# Patient Record
Sex: MALE | Race: WHITE | NOT HISPANIC OR LATINO | Employment: OTHER | URBAN - METROPOLITAN AREA
[De-identification: names, ages, dates, MRNs, and addresses within clinical notes are randomized per-mention and may not be internally consistent; named-entity substitution may affect disease eponyms.]

---

## 2017-05-17 PROBLEM — R06.83 SNORING: Status: ACTIVE | Noted: 2017-05-17

## 2017-05-17 PROBLEM — R41.9 COGNITIVE COMPLAINTS: Status: ACTIVE | Noted: 2017-05-17

## 2018-08-17 PROBLEM — M17.12 PRIMARY OSTEOARTHRITIS OF LEFT KNEE: Status: ACTIVE | Noted: 2018-08-17

## 2018-08-17 PROBLEM — M25.462 EFFUSION OF LEFT KNEE: Status: ACTIVE | Noted: 2018-08-17

## 2020-03-04 ENCOUNTER — HOSPITAL ENCOUNTER (OUTPATIENT)
Dept: BEHAVIORAL HEALTH | Age: 75
Discharge: STILL ADMITTED OR ADMINISTRATIVE | End: 2020-03-04
Attending: PSYCHOLOGIST

## 2020-03-04 PROCEDURE — 96116 NUBHVL XM PHYS/QHP 1ST HR: CPT | Performed by: PSYCHOLOGIST

## 2020-03-04 PROCEDURE — 96136 PSYCL/NRPSYC TST PHY/QHP 1ST: CPT | Performed by: PSYCHOLOGIST

## 2020-03-04 PROCEDURE — 96121 NUBHVL XM PHY/QHP EA ADDL HR: CPT | Performed by: PSYCHOLOGIST

## 2020-03-25 ENCOUNTER — HOSPITAL ENCOUNTER (OUTPATIENT)
Dept: BEHAVIORAL HEALTH | Age: 75
Discharge: STILL ADMITTED OR ADMINISTRATIVE | End: 2020-03-25
Attending: PSYCHOLOGIST

## 2020-03-25 PROCEDURE — 90791 PSYCH DIAGNOSTIC EVALUATION: CPT | Performed by: PSYCHOLOGIST

## 2020-06-19 PROBLEM — G31.9 CEREBRAL ATROPHY, MILD (HCC): Status: ACTIVE | Noted: 2020-06-19

## 2021-01-08 PROBLEM — G30.1 LATE ONSET ALZHEIMER'S DISEASE WITHOUT BEHAVIORAL DISTURBANCE (HCC): Status: ACTIVE | Noted: 2017-05-17

## 2021-01-08 PROBLEM — F02.80 LATE ONSET ALZHEIMER'S DISEASE WITHOUT BEHAVIORAL DISTURBANCE (HCC): Status: ACTIVE | Noted: 2017-05-17

## 2021-01-26 PROBLEM — I77.1 TORTUOUS AORTA (HCC): Status: ACTIVE | Noted: 2021-01-26

## 2021-06-16 ENCOUNTER — APPOINTMENT (OUTPATIENT)
Dept: GENERAL RADIOLOGY | Facility: HOSPITAL | Age: 76
End: 2021-06-16
Attending: EMERGENCY MEDICINE
Payer: MEDICARE

## 2021-06-16 ENCOUNTER — HOSPITAL ENCOUNTER (EMERGENCY)
Facility: HOSPITAL | Age: 76
Discharge: HOME | End: 2021-06-16
Attending: EMERGENCY MEDICINE
Payer: MEDICARE

## 2021-06-16 VITALS
HEART RATE: 45 BPM | DIASTOLIC BLOOD PRESSURE: 74 MMHG | BODY MASS INDEX: 31 KG/M2 | WEIGHT: 229.94 LBS | TEMPERATURE: 98 F | OXYGEN SATURATION: 100 % | SYSTOLIC BLOOD PRESSURE: 129 MMHG | RESPIRATION RATE: 15 BRPM

## 2021-06-16 DIAGNOSIS — E16.2 HYPOGLYCEMIA: ICD-10-CM

## 2021-06-16 DIAGNOSIS — R00.1 BRADYCARDIA: Primary | ICD-10-CM

## 2021-06-16 PROCEDURE — 93005 ELECTROCARDIOGRAM TRACING: CPT

## 2021-06-16 PROCEDURE — 96360 HYDRATION IV INFUSION INIT: CPT

## 2021-06-16 PROCEDURE — 85025 COMPLETE CBC W/AUTO DIFF WBC: CPT | Performed by: EMERGENCY MEDICINE

## 2021-06-16 PROCEDURE — 83735 ASSAY OF MAGNESIUM: CPT | Performed by: EMERGENCY MEDICINE

## 2021-06-16 PROCEDURE — 71045 X-RAY EXAM CHEST 1 VIEW: CPT | Performed by: EMERGENCY MEDICINE

## 2021-06-16 PROCEDURE — 80048 BASIC METABOLIC PNL TOTAL CA: CPT | Performed by: EMERGENCY MEDICINE

## 2021-06-16 PROCEDURE — 99285 EMERGENCY DEPT VISIT HI MDM: CPT

## 2021-06-16 PROCEDURE — 84484 ASSAY OF TROPONIN QUANT: CPT | Performed by: EMERGENCY MEDICINE

## 2021-06-16 PROCEDURE — 93010 ELECTROCARDIOGRAM REPORT: CPT | Performed by: EMERGENCY MEDICINE

## 2021-06-16 PROCEDURE — 96361 HYDRATE IV INFUSION ADD-ON: CPT

## 2021-06-16 NOTE — ED INITIAL ASSESSMENT (HPI)
Patient brought in by [facility] for evaluation of bradycardia s/p colonoscopy at a local surgery center. Patient's HR dropped as low as 40 bpm during the procedure.  Patient asymptomatic, HR in the 50's upon arrival.

## 2021-06-16 NOTE — ED PROVIDER NOTES
Patient Seen in: [facility] AND [facility] Emergency Department    History   Patient presents with:  Bradycardia      HPI    74-year-old male presents the ER for evaluation of bradycardia. Patient had recently had a colonoscopy at outpatient surgery center.   Radha No      ROS  All pertinent positives for the review of systems are mentioned in the HPI  All other organ systems are reviewed and are negative. Constitutional and vital signs reviewed.       Social History and Family History elements reviewed from today, symmetric. Psychiatric:         Behavior: Behavior normal.         Thought Content:  Thought content normal.         Judgment: Judgment normal.         ED Course        Labs Reviewed   BASIC METABOLIC PANEL (8) - Abnormal; Notable for the following compon reviewed all labs and imaging if ordered    Pulse Ox: 99%, Room air, Normal     Monitor Interpretation:   normal sinus rhythm    Differential Diagnosis/ Diagnostic Considerations: Likely abnormality, anesthesia reaction, hypoglycemia, conduction abnormalit

## 2021-06-16 NOTE — PROGRESS NOTES
ASSESSMENT/PLAN:     Impression: 1. Bradycardia in a 79-year-old male with no cardiac history no symptoms of bradycardia arrhythmias. His baseline EKG reveals a sinus rhythm with normal NJ and QRS intervals. 2.  Type 1 diabetes.     Recommendation: I tablet (20 mg total) by mouth nightly. 90 tablet 3   • LANTUS SOLOSTAR 100 UNIT/ML Subcutaneous Solution Pen-injector Inject 10 Units into the skin daily as needed. 15 mL 1   • Tadalafil 10 MG Oral Tab Take 1 tablet (10 mg total) by mouth as needed.  24 tab ENT:mucosa pink and moist. NECK:jugular venous pressure not elevated. RESP:normal rate and rhythm, clear to auscultation. GI:soft, non-tender;rectal deferred. MS:adequate gait for exercise testing. EXT:no clubbing or cyanosis. SKIN:no rashes,lesions.  NEURO

## 2021-11-30 ENCOUNTER — EXTERNAL RECORD (OUTPATIENT)
Dept: HEALTH INFORMATION MANAGEMENT | Facility: OTHER | Age: 76
End: 2021-11-30

## 2022-02-01 ENCOUNTER — EXTERNAL RECORD (OUTPATIENT)
Dept: HEALTH INFORMATION MANAGEMENT | Facility: OTHER | Age: 77
End: 2022-02-01

## 2022-02-08 PROBLEM — D69.6 THROMBOCYTOPENIA (HCC): Status: ACTIVE | Noted: 2022-02-08

## 2022-07-13 ENCOUNTER — TELEPHONE (OUTPATIENT)
Dept: ENDOCRINOLOGY | Age: 77
End: 2022-07-13

## 2022-09-19 ENCOUNTER — TELEPHONE (OUTPATIENT)
Dept: ENDOCRINOLOGY | Age: 77
End: 2022-09-19

## 2022-10-17 ENCOUNTER — TELEPHONE (OUTPATIENT)
Dept: ENDOCRINOLOGY | Age: 77
End: 2022-10-17

## 2022-10-17 DIAGNOSIS — E10.9 TYPE 1 DIABETES MELLITUS WITHOUT COMPLICATION (CMD): Primary | ICD-10-CM

## 2022-10-20 ENCOUNTER — CLINICAL DOCUMENTATION (OUTPATIENT)
Dept: PEDIATRIC ENDOCRINOLOGY | Age: 77
End: 2022-10-20

## 2022-10-24 RX ORDER — INSULIN LISPRO 100 [IU]/ML
INJECTION, SOLUTION INTRAVENOUS; SUBCUTANEOUS
Qty: 60 ML | Refills: 1 | Status: SHIPPED | OUTPATIENT
Start: 2022-10-24

## 2022-11-05 ENCOUNTER — HOSPITAL ENCOUNTER (INPATIENT)
Facility: HOSPITAL | Age: 77
LOS: 6 days | Discharge: NURSING, REHAB OR LONG-TERM CARE | End: 2022-11-11
Attending: EMERGENCY MEDICINE | Admitting: INTERNAL MEDICINE
Payer: MEDICARE

## 2022-11-05 ENCOUNTER — APPOINTMENT (OUTPATIENT)
Dept: CT IMAGING | Facility: HOSPITAL | Age: 77
End: 2022-11-05
Attending: EMERGENCY MEDICINE
Payer: MEDICARE

## 2022-11-05 ENCOUNTER — APPOINTMENT (OUTPATIENT)
Dept: GENERAL RADIOLOGY | Facility: HOSPITAL | Age: 77
End: 2022-11-05
Attending: EMERGENCY MEDICINE
Payer: MEDICARE

## 2022-11-05 DIAGNOSIS — R46.89 AGGRESSIVE BEHAVIOR: ICD-10-CM

## 2022-11-05 DIAGNOSIS — F32.A DEPRESSION, UNSPECIFIED DEPRESSION TYPE: Primary | ICD-10-CM

## 2022-11-05 DIAGNOSIS — J11.1 INFLUENZA: ICD-10-CM

## 2022-11-05 LAB
ALBUMIN SERPL-MCNC: 3.2 G/DL (ref 3.4–5)
ALBUMIN/GLOB SERPL: 0.9 {RATIO} (ref 1–2)
ALP LIVER SERPL-CCNC: 100 U/L
ALT SERPL-CCNC: 60 U/L
AMPHET UR QL SCN: NEGATIVE
ANION GAP SERPL CALC-SCNC: 3 MMOL/L (ref 0–18)
APAP SERPL-MCNC: <2 UG/ML (ref 10–30)
AST SERPL-CCNC: 33 U/L (ref 15–37)
ATRIAL RATE: 53 BPM
BASOPHILS # BLD AUTO: 0.04 X10(3) UL (ref 0–0.2)
BASOPHILS NFR BLD AUTO: 0.8 %
BENZODIAZ UR QL SCN: NEGATIVE
BILIRUB SERPL-MCNC: 0.9 MG/DL (ref 0.1–2)
BILIRUB UR QL STRIP.AUTO: NEGATIVE
BUN BLD-MCNC: 16 MG/DL (ref 7–18)
CALCIUM BLD-MCNC: 9.1 MG/DL (ref 8.5–10.1)
CANNABINOIDS UR QL SCN: NEGATIVE
CHLORIDE SERPL-SCNC: 108 MMOL/L (ref 98–112)
CLARITY UR REFRACT.AUTO: CLEAR
CO2 SERPL-SCNC: 30 MMOL/L (ref 21–32)
COCAINE UR QL: NEGATIVE
COLOR UR AUTO: YELLOW
CREAT BLD-MCNC: 0.93 MG/DL
CREAT UR-SCNC: 99.8 MG/DL
EOSINOPHIL # BLD AUTO: 0.25 X10(3) UL (ref 0–0.7)
EOSINOPHIL NFR BLD AUTO: 4.8 %
ERYTHROCYTE [DISTWIDTH] IN BLOOD BY AUTOMATED COUNT: 12.1 %
EST. AVERAGE GLUCOSE BLD GHB EST-MCNC: 171 MG/DL (ref 68–126)
ETHANOL SERPL-MCNC: <3 MG/DL (ref ?–3)
FLUAV + FLUBV RNA SPEC NAA+PROBE: NEGATIVE
FLUAV + FLUBV RNA SPEC NAA+PROBE: POSITIVE
GFR SERPLBLD BASED ON 1.73 SQ M-ARVRAT: 86 ML/MIN/1.73M2 (ref 60–?)
GLOBULIN PLAS-MCNC: 3.6 G/DL (ref 2.8–4.4)
GLUCOSE BLD-MCNC: 157 MG/DL (ref 70–99)
GLUCOSE BLD-MCNC: 218 MG/DL (ref 70–99)
GLUCOSE UR STRIP.AUTO-MCNC: NEGATIVE MG/DL
HBA1C MFR BLD: 7.6 % (ref ?–5.7)
HCT VFR BLD AUTO: 38 %
HGB BLD-MCNC: 12.7 G/DL
IMM GRANULOCYTES # BLD AUTO: 0.02 X10(3) UL (ref 0–1)
IMM GRANULOCYTES NFR BLD: 0.4 %
KETONES UR STRIP.AUTO-MCNC: NEGATIVE MG/DL
LEUKOCYTE ESTERASE UR QL STRIP.AUTO: NEGATIVE
LYMPHOCYTES # BLD AUTO: 1.19 X10(3) UL (ref 1–4)
LYMPHOCYTES NFR BLD AUTO: 23 %
MCH RBC QN AUTO: 32.8 PG (ref 26–34)
MCHC RBC AUTO-ENTMCNC: 33.4 G/DL (ref 31–37)
MCV RBC AUTO: 98.2 FL
MDMA UR QL SCN: NEGATIVE
MONOCYTES # BLD AUTO: 0.51 X10(3) UL (ref 0.1–1)
MONOCYTES NFR BLD AUTO: 9.9 %
NEUTROPHILS # BLD AUTO: 3.16 X10 (3) UL (ref 1.5–7.7)
NEUTROPHILS # BLD AUTO: 3.16 X10(3) UL (ref 1.5–7.7)
NEUTROPHILS NFR BLD AUTO: 61.1 %
NITRITE UR QL STRIP.AUTO: NEGATIVE
OPIATES UR QL SCN: NEGATIVE
OSMOLALITY SERPL CALC.SUM OF ELEC: 300 MOSM/KG (ref 275–295)
OXYCODONE UR QL SCN: NEGATIVE
P AXIS: 60 DEGREES
P-R INTERVAL: 174 MS
PH UR STRIP.AUTO: 7 [PH] (ref 5–8)
PLATELET # BLD AUTO: 166 10(3)UL (ref 150–450)
POTASSIUM SERPL-SCNC: 4.6 MMOL/L (ref 3.5–5.1)
PROT SERPL-MCNC: 6.8 G/DL (ref 6.4–8.2)
PROT UR STRIP.AUTO-MCNC: NEGATIVE MG/DL
Q-T INTERVAL: 460 MS
QRS DURATION: 96 MS
QTC CALCULATION (BEZET): 431 MS
R AXIS: 15 DEGREES
RBC # BLD AUTO: 3.87 X10(6)UL
RBC UR QL AUTO: NEGATIVE
RSV RNA SPEC NAA+PROBE: NEGATIVE
SALICYLATES SERPL-MCNC: <1.7 MG/DL (ref 2.8–20)
SARS-COV-2 RNA RESP QL NAA+PROBE: NOT DETECTED
SODIUM SERPL-SCNC: 141 MMOL/L (ref 136–145)
SP GR UR STRIP.AUTO: 1.01 (ref 1–1.03)
T AXIS: 30 DEGREES
UROBILINOGEN UR STRIP.AUTO-MCNC: <2 MG/DL
VENTRICULAR RATE: 53 BPM
WBC # BLD AUTO: 5.2 X10(3) UL (ref 4–11)

## 2022-11-05 PROCEDURE — 70450 CT HEAD/BRAIN W/O DYE: CPT | Performed by: EMERGENCY MEDICINE

## 2022-11-05 PROCEDURE — 71045 X-RAY EXAM CHEST 1 VIEW: CPT | Performed by: EMERGENCY MEDICINE

## 2022-11-05 RX ORDER — QUETIAPINE FUMARATE 25 MG/1
50 TABLET, FILM COATED ORAL 3 TIMES DAILY
Status: DISCONTINUED | OUTPATIENT
Start: 2022-11-05 | End: 2022-11-08

## 2022-11-05 RX ORDER — NICOTINE POLACRILEX 4 MG
30 LOZENGE BUCCAL
Status: DISCONTINUED | OUTPATIENT
Start: 2022-11-05 | End: 2022-11-11

## 2022-11-05 RX ORDER — LORAZEPAM 0.5 MG/1
0.5 TABLET ORAL EVERY 4 HOURS PRN
Status: DISCONTINUED | OUTPATIENT
Start: 2022-11-05 | End: 2022-11-07

## 2022-11-05 RX ORDER — HEPARIN SODIUM 5000 [USP'U]/ML
5000 INJECTION, SOLUTION INTRAVENOUS; SUBCUTANEOUS EVERY 8 HOURS SCHEDULED
Status: DISCONTINUED | OUTPATIENT
Start: 2022-11-05 | End: 2022-11-11

## 2022-11-05 RX ORDER — HALOPERIDOL 5 MG/1
5 TABLET ORAL EVERY 4 HOURS PRN
Status: DISCONTINUED | OUTPATIENT
Start: 2022-11-05 | End: 2022-11-11

## 2022-11-05 RX ORDER — ESCITALOPRAM OXALATE 10 MG/1
10 TABLET ORAL DAILY
Status: DISCONTINUED | OUTPATIENT
Start: 2022-11-05 | End: 2022-11-06

## 2022-11-05 RX ORDER — ATORVASTATIN CALCIUM 20 MG/1
20 TABLET, FILM COATED ORAL NIGHTLY
Status: DISCONTINUED | OUTPATIENT
Start: 2022-11-05 | End: 2022-11-11

## 2022-11-05 RX ORDER — ESCITALOPRAM OXALATE 10 MG/1
10 TABLET ORAL DAILY
COMMUNITY

## 2022-11-05 RX ORDER — LORAZEPAM 1 MG/1
1 TABLET ORAL EVERY 4 HOURS PRN
Status: DISCONTINUED | OUTPATIENT
Start: 2022-11-05 | End: 2022-11-07

## 2022-11-05 RX ORDER — MEMANTINE HYDROCHLORIDE 10 MG/1
10 TABLET ORAL 2 TIMES DAILY
Status: DISCONTINUED | OUTPATIENT
Start: 2022-11-05 | End: 2022-11-11

## 2022-11-05 RX ORDER — HALOPERIDOL 5 MG/ML
5 INJECTION INTRAMUSCULAR ONCE
Status: COMPLETED | OUTPATIENT
Start: 2022-11-05 | End: 2022-11-05

## 2022-11-05 RX ORDER — QUETIAPINE FUMARATE 25 MG/1
25 TABLET, FILM COATED ORAL DAILY PRN
COMMUNITY
End: 2022-11-11

## 2022-11-05 RX ORDER — QUETIAPINE FUMARATE 50 MG/1
50 TABLET, FILM COATED ORAL 3 TIMES DAILY
COMMUNITY
End: 2022-11-11

## 2022-11-05 RX ORDER — NICOTINE POLACRILEX 4 MG
15 LOZENGE BUCCAL
Status: DISCONTINUED | OUTPATIENT
Start: 2022-11-05 | End: 2022-11-11

## 2022-11-05 RX ORDER — HALOPERIDOL 5 MG/ML
5 INJECTION INTRAMUSCULAR EVERY 4 HOURS PRN
Status: DISCONTINUED | OUTPATIENT
Start: 2022-11-05 | End: 2022-11-11

## 2022-11-05 RX ORDER — ONDANSETRON 2 MG/ML
4 INJECTION INTRAMUSCULAR; INTRAVENOUS EVERY 4 HOURS PRN
Status: ACTIVE | OUTPATIENT
Start: 2022-11-05 | End: 2022-11-05

## 2022-11-05 RX ORDER — ACETAMINOPHEN 500 MG
500 TABLET ORAL EVERY 4 HOURS PRN
Status: DISCONTINUED | OUTPATIENT
Start: 2022-11-05 | End: 2022-11-11

## 2022-11-05 RX ORDER — DONEPEZIL HYDROCHLORIDE 10 MG/1
10 TABLET, FILM COATED ORAL NIGHTLY
Status: DISCONTINUED | OUTPATIENT
Start: 2022-11-05 | End: 2022-11-11

## 2022-11-05 RX ORDER — DEXTROSE MONOHYDRATE 25 G/50ML
50 INJECTION, SOLUTION INTRAVENOUS
Status: DISCONTINUED | OUTPATIENT
Start: 2022-11-05 | End: 2022-11-11

## 2022-11-05 RX ORDER — LORAZEPAM 1 MG/1
2 TABLET ORAL EVERY 4 HOURS PRN
Status: DISCONTINUED | OUTPATIENT
Start: 2022-11-05 | End: 2022-11-07

## 2022-11-05 NOTE — ED NOTES
The patient's case was discussed with the [facility] crisis worker. They look for beds and almost all the facilities in [facility] and [facility] no beds are available for this patient because of his influenza, diabetes. .  I did discuss this case with duly hospitalist.  I also did talk back to and called Rigo Julian the crisis worker from [facility] we will put in a psych consultation. He was given Haldol here. Will need a sitter.

## 2022-11-05 NOTE — ED NOTES
Received a call from ED MD stating that Patient tested positive for Influenza A. If in-patient is recommended, will have to find out if Patient can be placed.

## 2022-11-05 NOTE — ED NOTES
Pt signed in voluntarily and signed pt rights documents. Scanned into chart, copies to pt and paper chart.
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
## 2022-11-05 NOTE — ED QUICK NOTES
Pt's spouse is leaving and is requesting a phone call when pt is transferred to his inpatient room. [phone].

## 2022-11-05 NOTE — ED QUICK NOTES
Pt became very agitated and trying to elope. Pt's belongings taken and public safety called. Pt given IM haldol.

## 2022-11-05 NOTE — ED QUICK NOTES
Orders for admission, patient is aware of plan and ready to go upstairs. Any questions, please call ED RN Boo Loza at extension #[phone]. Patient Covid vaccination status: Fully vaccinated     COVID Test Ordered in ED: SARS-CoV-2/Flu A and B/RSV by PCR (GeneXpert)    COVID Suspicion at Admission: N/A    Running Infusions:  None    Mental Status/LOC at time of transport: Alert with episodes of confusion. Pt is easily agitated but redirectable. Pt has sitter at bedside.  Hx alzheimer's    Other pertinent information:   CIWA score: N/A   NIH score:  N/A

## 2022-11-05 NOTE — ED NOTES
The patient became more agitated here patient was given Haldol. Talk to the [facility] . Patient was petition certified. .  Will attempt to get him to a psych facility because he is influenza positive but is not they do not take him I did talk to the [facility] hospitalist they are still trying to find placement if there is no placement soon will need to admit him to the [facility] behavioral room.

## 2022-11-06 LAB
ANION GAP SERPL CALC-SCNC: 6 MMOL/L (ref 0–18)
BASOPHILS # BLD AUTO: 0.04 X10(3) UL (ref 0–0.2)
BASOPHILS NFR BLD AUTO: 0.7 %
BUN BLD-MCNC: 14 MG/DL (ref 7–18)
CALCIUM BLD-MCNC: 8.4 MG/DL (ref 8.5–10.1)
CHLORIDE SERPL-SCNC: 106 MMOL/L (ref 98–112)
CO2 SERPL-SCNC: 27 MMOL/L (ref 21–32)
CREAT BLD-MCNC: 0.82 MG/DL
EOSINOPHIL # BLD AUTO: 0.23 X10(3) UL (ref 0–0.7)
EOSINOPHIL NFR BLD AUTO: 4.2 %
ERYTHROCYTE [DISTWIDTH] IN BLOOD BY AUTOMATED COUNT: 12 %
GFR SERPLBLD BASED ON 1.73 SQ M-ARVRAT: 92 ML/MIN/1.73M2 (ref 60–?)
GLUCOSE BLD-MCNC: 196 MG/DL (ref 70–99)
GLUCOSE BLD-MCNC: 219 MG/DL (ref 70–99)
GLUCOSE BLD-MCNC: 221 MG/DL (ref 70–99)
GLUCOSE BLD-MCNC: 250 MG/DL (ref 70–99)
GLUCOSE BLD-MCNC: 272 MG/DL (ref 70–99)
GLUCOSE BLD-MCNC: 278 MG/DL (ref 70–99)
HCT VFR BLD AUTO: 38.5 %
HGB BLD-MCNC: 12.6 G/DL
IMM GRANULOCYTES # BLD AUTO: 0.02 X10(3) UL (ref 0–1)
IMM GRANULOCYTES NFR BLD: 0.4 %
LYMPHOCYTES # BLD AUTO: 1.3 X10(3) UL (ref 1–4)
LYMPHOCYTES NFR BLD AUTO: 23.5 %
MCH RBC QN AUTO: 32.3 PG (ref 26–34)
MCHC RBC AUTO-ENTMCNC: 32.7 G/DL (ref 31–37)
MCV RBC AUTO: 98.7 FL
MONOCYTES # BLD AUTO: 0.46 X10(3) UL (ref 0.1–1)
MONOCYTES NFR BLD AUTO: 8.3 %
NEUTROPHILS # BLD AUTO: 3.48 X10 (3) UL (ref 1.5–7.7)
NEUTROPHILS # BLD AUTO: 3.48 X10(3) UL (ref 1.5–7.7)
NEUTROPHILS NFR BLD AUTO: 62.9 %
OSMOLALITY SERPL CALC.SUM OF ELEC: 295 MOSM/KG (ref 275–295)
PLATELET # BLD AUTO: 166 10(3)UL (ref 150–450)
POTASSIUM SERPL-SCNC: 4.4 MMOL/L (ref 3.5–5.1)
RBC # BLD AUTO: 3.9 X10(6)UL
SODIUM SERPL-SCNC: 139 MMOL/L (ref 136–145)
WBC # BLD AUTO: 5.5 X10(3) UL (ref 4–11)

## 2022-11-06 PROCEDURE — 90792 PSYCH DIAG EVAL W/MED SRVCS: CPT | Performed by: PHYSICIAN ASSISTANT

## 2022-11-06 RX ORDER — ESCITALOPRAM OXALATE 20 MG/1
20 TABLET ORAL DAILY
Status: DISCONTINUED | OUTPATIENT
Start: 2022-11-07 | End: 2022-11-11

## 2022-11-06 RX ORDER — INSULIN ASPART 100 [IU]/ML
3 INJECTION, SOLUTION INTRAVENOUS; SUBCUTANEOUS ONCE
Status: DISCONTINUED | OUTPATIENT
Start: 2022-11-06 | End: 2022-11-11

## 2022-11-06 NOTE — BH PROGRESS NOTE
The voluntary form was reviewed and it was not signed in the middle by the Norbert Kearney from [facility]. He signed it today. It was up loaded to this record. Went to the patients room and he said, he never got a copy yesterday so he received the new copy and the rights form too.

## 2022-11-06 NOTE — ED QUICK NOTES
Assumed care of patient at this time. 1:1 sitter remains in place.   Awaiting room assignment to be cleaned

## 2022-11-06 NOTE — PLAN OF CARE
Pt remains confused and focused on his blood sugar. Kept asking me to recheck his sugar. Slept most of night. Denied any pain. On room air. Sitter at bedside.
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
## 2022-11-06 NOTE — PHYSICAL THERAPY NOTE
Ordered received for Physical Therapy services. Patient received sitting at edge of bed with sitter present. Patient showing therapist his phone with blood sugar of 291 and demanding insulin. Attempted to encourage patient to perform functional mobility skills to assist with regulation of BS and reports \"I will go into a coma\". Patient becoming agitated so PT services deferred, skyla and RN aware. Will reattempt PT services as able.

## 2022-11-06 NOTE — PROGRESS NOTES
NURSING ADMISSION NOTE      Patient admitted via Cart  Oriented to room. Safety precautions initiated. Bed in low position. Call light in reach. Pt is aox3. But can be confused and is fixated on hi blood sugars, dexcon and insulin pump. Insulin pump removed and taken home by wife. Suicide precautions. Sitter at bedside. Droplet for Influenza A  Admission completed. Medicated per orders. Pt states no pain  Up with assist to bathroom  At th is time calm and cooperative.

## 2022-11-07 LAB
GLUCOSE BLD-MCNC: 199 MG/DL (ref 70–99)
GLUCOSE BLD-MCNC: 214 MG/DL (ref 70–99)
GLUCOSE BLD-MCNC: 321 MG/DL (ref 70–99)
GLUCOSE BLD-MCNC: 335 MG/DL (ref 70–99)

## 2022-11-07 PROCEDURE — 99233 SBSQ HOSP IP/OBS HIGH 50: CPT | Performed by: CLINICAL NURSE SPECIALIST

## 2022-11-07 PROCEDURE — 99232 SBSQ HOSP IP/OBS MODERATE 35: CPT | Performed by: OTHER

## 2022-11-07 NOTE — PLAN OF CARE
Patient A&O, no c/o pain, lungs diminished with occasional cough. Patient very obsessed with getting his BS checked to start the shift. Boundaries were set with patient and he was told what time things would happen and he seemed to respond well. His blood sugar done near 2100 and you could see the demeanor of the patient change. He got happier, relaxed, and being in a good mood. Tucked himself into bed and went to sleep.
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
## 2022-11-07 NOTE — BH PROGRESS NOTE
-HERACLIO spoke with pt's spouse, Meg Kinney about pt's options to go to inpatient [facility]. Meg Kinney expressed to send this pt to [facility] upon dc from the hospital. SW updated Meg Kinney with [facility] OON status with pt's insurance. Meg Kinney did not want this  to send inpatient psych referrals for this pt to;    [address] [place] [address],[address] agreed to send the referrals to [facility] & [facility], only. Meg Kinney was told to provide more hospitals' names in order to send the referrals since bed availability can be limited. Meg Kinney was still reluctant to provide the name of any other hospital nor allowing this - to send referrals to any other place besides [facility] at this time. NICK faxed them the packet. Awaiting them to review it.

## 2022-11-07 NOTE — PLAN OF CARE
Assumed pt care at 0730. A&Ox4 this AM, can be forgetful at times regarding situation. VSS. No telemetry. Room air. 1:1 care companion at bedside for suicide & elopement precautions. Medically cleared per hospitalist, awaiting transfer to inpt psych unit. Denies pain, denies N/V. R AC PIV SL. Voiding, briefed d/t mixed continence. Carb controlled diet, QID accuchecks. Heparin subcutaneous for VTE prevention. Up x1 assist, PT eval this AM. Pt updated with POC.      Problem: Risk for Violence/Aggression  Goal: Absence of Violence/Aggression  Description: INTERVENTIONS:   - Identify precipitating factors for behavior   - Notify Charge RN/Provider   - Consider decreasing stimulation   - Consider distraction measures   - Consider discussion with provider regarding prn meds    - Consider ELIANA (Moderate Risk only)   - Consider Code Support (High Risk only)   - Consider room safety checks   - Consider restraints  Outcome: Progressing     Problem: Diabetes/Glucose Control  Goal: Glucose maintained within prescribed range  Description: INTERVENTIONS:  - Monitor Blood Glucose as ordered  - Assess for signs and symptoms of hyperglycemia and hypoglycemia  - Administer ordered medications to maintain glucose within target range  - Assess barriers to adequate nutritional intake and initiate nutrition consult as needed  - Instruct patient on self management of diabetes  Outcome: Progressing     Problem: Patient/Family Goals  Goal: Patient/Family Long Term Goal  Description: Patient's Long Term Goal: discharge  Interventions:  - get bed placement at inpt psych facility  - See additional Care Plan goals for specific interventions  Outcome: Progressing  Goal: Patient/Family Short Term Goal  Description: Patient's Short Term Goal: to sleep  Interventions:   - cluster care  - See additional Care Plan goals for specific interventions  Outcome: Progressing

## 2022-11-07 NOTE — BH PROGRESS NOTE
NICK spoke with intake at [facility]- Pt's packet was faxed to them earlier today. They are reviewing pt's clinical information. OON with [facility].

## 2022-11-08 LAB
GLUCOSE BLD-MCNC: 189 MG/DL (ref 70–99)
GLUCOSE BLD-MCNC: 190 MG/DL (ref 70–99)
GLUCOSE BLD-MCNC: 307 MG/DL (ref 70–99)
GLUCOSE BLD-MCNC: 314 MG/DL (ref 70–99)

## 2022-11-08 PROCEDURE — 99232 SBSQ HOSP IP/OBS MODERATE 35: CPT | Performed by: OTHER

## 2022-11-08 RX ORDER — QUETIAPINE FUMARATE 25 MG/1
25 TABLET, FILM COATED ORAL 3 TIMES DAILY
Status: DISCONTINUED | OUTPATIENT
Start: 2022-11-08 | End: 2022-11-09

## 2022-11-08 RX ORDER — RISPERIDONE 0.25 MG/1
1 TABLET ORAL NIGHTLY
Status: DISCONTINUED | OUTPATIENT
Start: 2022-11-08 | End: 2022-11-09

## 2022-11-08 NOTE — BH PROGRESS NOTE
NICK followed up with [facility], pt's packet was faxed to them yesterday, Per Nathan Rodriguez, it is still under review and they also do not have a bed availability today. Nathan Rodriguez asked to follow up with them on 11/9.
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
## 2022-11-08 NOTE — BH PROGRESS NOTE
Pt's wife, Qi Nicolas agreed to send more referrals to [facility]. Packet faxed @ [phone] to [facility] for [facility] and [place], [place]. Updated P&C will be faxed over once asked by intake.

## 2022-11-08 NOTE — PLAN OF CARE
PT A/O, FORGETFUL, CALM, COOPERATIVE, 1:1 sitter, voiding in bathroom, accuchecks, isolation, waiting for placement for inpt psych    Problem: Risk for Violence/Aggression  Goal: Absence of Violence/Aggression  Description: INTERVENTIONS:   - Identify precipitating factors for behavior   - Notify Charge RN/Provider   - Consider decreasing stimulation   - Consider distraction measures   - Consider discussion with provider regarding prn meds    - Consider ELIANA (Moderate Risk only)   - Consider Code Support (High Risk only)   - Consider room safety checks   - Consider restraints  Outcome: Progressing

## 2022-11-09 LAB
GLUCOSE BLD-MCNC: 145 MG/DL (ref 70–99)
GLUCOSE BLD-MCNC: 147 MG/DL (ref 70–99)
GLUCOSE BLD-MCNC: 163 MG/DL (ref 70–99)
GLUCOSE BLD-MCNC: 201 MG/DL (ref 70–99)
GLUCOSE BLD-MCNC: 206 MG/DL (ref 70–99)
GLUCOSE BLD-MCNC: 306 MG/DL (ref 70–99)
GLUCOSE BLD-MCNC: 335 MG/DL (ref 70–99)
SARS-COV-2 RNA RESP QL NAA+PROBE: NOT DETECTED

## 2022-11-09 PROCEDURE — 99232 SBSQ HOSP IP/OBS MODERATE 35: CPT | Performed by: OTHER

## 2022-11-09 RX ORDER — QUETIAPINE FUMARATE 25 MG/1
25 TABLET, FILM COATED ORAL 2 TIMES DAILY
Status: DISCONTINUED | OUTPATIENT
Start: 2022-11-10 | End: 2022-11-11

## 2022-11-09 NOTE — BH PROGRESS NOTE
Debi Mercer [facility] [address] to find out if they are going to have a bed at any of their locations. They said they don't have a packet for this patient. A packet was send and just waiting for their response. Dr. Ariella Staley updated.

## 2022-11-09 NOTE — PLAN OF CARE
Pt has been calm and cooperative today. Confused and very quiet. Wife at bedside updated with plan of care. Good appetite. Up to bathroom with standby assistance, steady gait.

## 2022-11-09 NOTE — PLAN OF CARE
1000  No iv site  No tele-ok per md awaiting discharge placement  Sitter at bedside for elopement  Monitoring glucose  Patient calm and cooperative this am and alert and oriented x4  Ra  Awaiting placement    1200  Per hospital protocol droplet isolation for 7 days from positive test. However patient continues to remain asymptomatic and have not have any fevers    1530  Sitter at bedside  Ambulating in room  Continues to be calm and cooperative

## 2022-11-09 NOTE — PLAN OF CARE
PT ALERT, CONFUSED AT TIMES, WOKE UP DURING NIGHT DISORIENTED, 95% ON RA, VOIDING IN BATHROOM, ACCUCHECKS, ATE SANDWICH DURING NIGHT AND WAS CONCERNED HIS BLOOD SUGAR WAS LOW, TOOK ACCUCHECK - 206, SITTER AT BEDSIDE.     Problem: Diabetes/Glucose Control  Goal: Glucose maintained within prescribed range  Description: INTERVENTIONS:  - Monitor Blood Glucose as ordered  - Assess for signs and symptoms of hyperglycemia and hypoglycemia  - Administer ordered medications to maintain glucose within target range  - Assess barriers to adequate nutritional intake and initiate nutrition consult as needed  - Instruct patient on self management of diabetes  Outcome: Progressing
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
## 2022-11-09 NOTE — CM/SW NOTE
Patient discussed in rounds, Pt needs psych placement. Pt can not go to [facility] due to insurance.

## 2022-11-09 NOTE — BH PROGRESS NOTE
[facility]- called and declined the patient due to his past aggression. They said, he would need a 1:1 and they don't have staff for that. Ashlie Hammonds they still are waiting to see if there will be any discharges.     [facility]- No liza beds available

## 2022-11-10 LAB
GLUCOSE BLD-MCNC: 229 MG/DL (ref 70–99)
GLUCOSE BLD-MCNC: 313 MG/DL (ref 70–99)
GLUCOSE BLD-MCNC: 366 MG/DL (ref 70–99)
GLUCOSE BLD-MCNC: 75 MG/DL (ref 70–99)

## 2022-11-10 PROCEDURE — 99232 SBSQ HOSP IP/OBS MODERATE 35: CPT | Performed by: OTHER

## 2022-11-10 NOTE — PROGRESS NOTES
Pt A&Ox4 Room Air  Sitter upright eating at this time  Denies pain   Meds given per Mar  Voids standby Assist  Pt is Medically Cleared!   Awaiting placement  Sitter at the bedside, Rito Risk  Isolation precaution   Safety precaution maintained  Will continue to Monitor

## 2022-11-10 NOTE — BH PROGRESS NOTE
Transfer summary:    [facility]- No liza beds try again tomorrow    [facility]- sent packet and waiting for them to let us know if they are taking the patient. They are aware to call the patients nurse if a bed is available after 4:30. Transfer P+C done and up loaded to this record. Patients wife is aware of the update.

## 2022-11-10 NOTE — BH PROGRESS NOTE
Received a call from the patients wife and she said, she is [facility]. The plan is for this  to let the wife know later if a bed becomes available.

## 2022-11-10 NOTE — BH PROGRESS NOTE
Transfer Summary:    1. [facility]- Not able to take today. They dont have a 1:1 staff in any of their hospitals. Will try again tomorrow. 2. [facility]- States they dont take [place] patients    3. [facility]- Per Gregoria, Call back after 10 am to see if any [place] beds available.

## 2022-11-11 VITALS
SYSTOLIC BLOOD PRESSURE: 124 MMHG | WEIGHT: 229.94 LBS | DIASTOLIC BLOOD PRESSURE: 60 MMHG | OXYGEN SATURATION: 98 % | BODY MASS INDEX: 30.81 KG/M2 | HEART RATE: 71 BPM | TEMPERATURE: 98 F | RESPIRATION RATE: 18 BRPM | HEIGHT: 72.44 IN

## 2022-11-11 LAB
GLUCOSE BLD-MCNC: 303 MG/DL (ref 70–99)
GLUCOSE BLD-MCNC: 318 MG/DL (ref 70–99)
GLUCOSE BLD-MCNC: 82 MG/DL (ref 70–99)
SARS-COV-2 RNA RESP QL NAA+PROBE: NOT DETECTED

## 2022-11-11 PROCEDURE — 99232 SBSQ HOSP IP/OBS MODERATE 35: CPT | Performed by: OTHER

## 2022-11-11 RX ORDER — RISPERIDONE 2 MG/1
2 TABLET ORAL NIGHTLY
Qty: 30 TABLET | Refills: 0 | Status: SHIPPED | OUTPATIENT
Start: 2022-11-11 | End: 2022-11-11

## 2022-11-11 RX ORDER — DIVALPROEX SODIUM 125 MG/1
125 CAPSULE, COATED PELLETS ORAL 2 TIMES DAILY
Qty: 30 CAPSULE | Refills: 0 | Status: SHIPPED | OUTPATIENT
Start: 2022-11-12

## 2022-11-11 RX ORDER — DIVALPROEX SODIUM 125 MG/1
125 CAPSULE, COATED PELLETS ORAL 2 TIMES DAILY
Status: DISCONTINUED | OUTPATIENT
Start: 2022-11-12 | End: 2022-11-11

## 2022-11-11 RX ORDER — RISPERIDONE 2 MG/1
2 TABLET ORAL NIGHTLY
Qty: 30 TABLET | Refills: 0 | Status: SHIPPED | OUTPATIENT
Start: 2022-11-11

## 2022-11-11 RX ORDER — QUETIAPINE FUMARATE 25 MG/1
25 TABLET, FILM COATED ORAL 2 TIMES DAILY PRN
Status: DISCONTINUED | OUTPATIENT
Start: 2022-11-11 | End: 2022-11-11

## 2022-11-11 RX ORDER — DIVALPROEX SODIUM 125 MG/1
125 CAPSULE, COATED PELLETS ORAL 2 TIMES DAILY
Qty: 30 CAPSULE | Refills: 0 | Status: SHIPPED | OUTPATIENT
Start: 2022-11-12 | End: 2022-11-11

## 2022-11-11 RX ORDER — RISPERIDONE 1 MG/1
2 TABLET ORAL NIGHTLY
Status: DISCONTINUED | OUTPATIENT
Start: 2022-11-11 | End: 2022-11-11

## 2022-11-11 NOTE — PLAN OF CARE
Assumed care of pt @ 0730. Pt is A/Ox 2-3, disoriented to place and time. Orientation waxes and wanes. Pt very anxious today, seen pacing multiple times. Wants to leave, wants to go home. Tired of being here. Did throw his phone today because his blood sugar was high. On RA, VSS. No tele and pulse ox; pt refused, MD aware and ok to keep IV out. Tolerating diet. Blood sugar high this evening per Dr Leila Laird still given just 10 units of insulin, no addition insulin ordered. Pt denies pain  Up as tolerated SBA  Intake/outputs WNL. Plan awaiting placement as medically cleared for dc to psych facility    Updated POC with patient and family. Will continue to monitor.         Problem: Risk for Violence/Aggression  Goal: Absence of Violence/Aggression  Description: INTERVENTIONS:   - Identify precipitating factors for behavior   - Notify Charge RN/Provider   - Consider decreasing stimulation   - Consider distraction measures   - Consider discussion with provider regarding prn meds    - Consider ELIANA (Moderate Risk only)   - Consider Code Support (High Risk only)   - Consider room safety checks   - Consider restraints  Outcome: Adequate for Discharge     Problem: Diabetes/Glucose Control  Goal: Glucose maintained within prescribed range  Description: INTERVENTIONS:  - Monitor Blood Glucose as ordered  - Assess for signs and symptoms of hyperglycemia and hypoglycemia  - Administer ordered medications to maintain glucose within target range  - Assess barriers to adequate nutritional intake and initiate nutrition consult as needed  - Instruct patient on self management of diabetes  Outcome: Adequate for Discharge     Problem: Patient/Family Goals  Goal: Patient/Family Long Term Goal  Description: Patient's Long Term Goal: discharge    Interventions:  - get bed placement at inpt psych facility  - See additional Care Plan goals for specific interventions  Outcome: Adequate for Discharge  Goal: Patient/Family Short Term Goal  Description: Patient's Short Term Goal: to sleep    Interventions:   - cluster care  - See additional Care Plan goals for specific interventions  Outcome: Adequate for Discharge

## 2022-11-11 NOTE — PROGRESS NOTES
Pt is A&O x2-3, forgetful. Intermittent anxiety but remains cooperative this shift. VSS- refusing tele. RA. Lungs clear. Pt denies pain/nausea/SOB/dizziness. 1:1 care companion remains at bedside. Isolation precautions in place. Carb controlled diet with accuchecks QID. Levemir and sliding scale insulin given per MAR. Psych following case. Awaiting inpatient psych placement.

## 2022-11-11 NOTE — BH PROGRESS NOTE
Transfer Summary:    [facility] with his insurance    [facility]- No beds    [facility]- Def[facility] said, they still would put him on a 1:1 due to his history of aggression. They have no staff available for that at any of their hospitals. [facility]- No liza beds    [facility] not able to take him due to his aggressive behavior. [facility]- waiting for the supervisor to call back. I called them back and they are just reviewing and will call the patients nurse if any beds tonight. [facility]- not accepting any new patients. [facility]- Deflected due to his aggressive behavior and blood sugars too high    [facility] - No answer called 2  not able to leave a message    [facility]- No answer- Talked to them 4 times today and faxed packet x2. They are reviewing and will call the nurse if a bed becomes available. [address] - [address] have a Liza Unit    [facility]- Doesn't take patients above age 54. [facility]- Left message waiting to hear back from them.     [facility]-  [facility]- Will not take patients with diagnosis of Dementia      Informed the following of this information:  Dr. Wang Logan  Patients nurse  Patient and his wife

## 2022-11-12 NOTE — PLAN OF CARE
NURSING DISCHARGE NOTE    Discharged Other, (see nursing note) via Ambulance. Accompanied by Spouse and Support staff  Belongings Taken by patient/family. Discharged to [facility]. Belongings taken by patient and wife. Discharge instructions and papers reviewed with wife. All questions regarding new meds and follow ups reviewed with patients wife.

## 2022-11-12 NOTE — PLAN OF CARE
Assumed care of pt @ 0730. Pt is A/Ox 2-3, disoriented to place and time. Orientation waxes and wanes. Pt more calm today. On RA, VSS. No tele and pulse ox; pt refused, MD aware and ok to keep IV out. tolerating diet. Blood sugar high carb coverage added. Pt denies pain  Up as tolerated SBA  Intake/outputs WNL. Plan awaiting placement as medically cleared for dc to psych facility     Updated POC with patient and family. Will continue to monitor.         Problem: Risk for Violence/Aggression  Goal: Absence of Violence/Aggression  Description: INTERVENTIONS:   - Identify precipitating factors for behavior   - Notify Charge RN/Provider   - Consider decreasing stimulation   - Consider distraction measures   - Consider discussion with provider regarding prn meds    - Consider ELIANA (Moderate Risk only)   - Consider Code Support (High Risk only)   - Consider room safety checks   - Consider restraints  Outcome: Adequate for Discharge     Problem: Diabetes/Glucose Control  Goal: Glucose maintained within prescribed range  Description: INTERVENTIONS:  - Monitor Blood Glucose as ordered  - Assess for signs and symptoms of hyperglycemia and hypoglycemia  - Administer ordered medications to maintain glucose within target range  - Assess barriers to adequate nutritional intake and initiate nutrition consult as needed  - Instruct patient on self management of diabetes  Outcome: Adequate for Discharge     Problem: Patient/Family Goals  Goal: Patient/Family Long Term Goal  Description: Patient's Long Term Goal: discharge    Interventions:  - get bed placement at in psych facility  - See additional Care Plan goals for specific interventions  Outcome: Adequate for Discharge  Goal: Patient/Family Short Term Goal  Description: Patient's Short Term Goal: to sleep    Interventions:   - cluster care  - See additional Care Plan goals for specific interventions  Outcome: Adequate for Discharge

## 2022-11-12 NOTE — CM/SW NOTE
NIRMALA RECEIVED A CALL FROM PATIENTS WIFE CAROLA THAT THE FACILITY HER  WAS SENT TO STATED THEY DO NOT HAVE HIS MEDICATION LIST. INRMALA CALLED AND SPOKE WITH JASON GERONIMO AT [place] AND SHE STATED SHE WILL CALL THE FACILITY [facility] IN [place] AND FAX THEM THE PATIENTS MAR. NIRMALA CALLED THE PATIENTS WIFE BACK AND ASSURED HER THAT WE WILL MAKE SURE THE FACILITY GETS THE PATIENTS MEDICATION LIST.     Thank you so very much

## 2022-11-14 NOTE — PAYOR COMM NOTE
--------------  DISCHARGE REVIEW    Payor: [address] [place], [place] [address] #:  [id number]  Authorization Number: [id number]    Admit date: 11/5/22  Admit time:   8:26 PM  Discharge Date: 11/11/2022  8:15 PM     Admitting Physician: Rangel Escobar MD  Attending Physician:  No att. providers found  Primary Care Physician: Luz Mix MD

## 2024-09-12 ENCOUNTER — APPOINTMENT (OUTPATIENT)
Dept: GENERAL RADIOLOGY | Facility: HOSPITAL | Age: 79
End: 2024-09-12
Attending: EMERGENCY MEDICINE
Payer: MEDICARE

## 2024-09-12 ENCOUNTER — HOSPITAL ENCOUNTER (EMERGENCY)
Facility: HOSPITAL | Age: 79
Discharge: HOME | End: 2024-09-12
Attending: EMERGENCY MEDICINE
Payer: MEDICARE

## 2024-09-12 VITALS
HEART RATE: 42 BPM | TEMPERATURE: 97 F | OXYGEN SATURATION: 100 % | RESPIRATION RATE: 19 BRPM | BODY MASS INDEX: 26 KG/M2 | WEIGHT: 192 LBS | SYSTOLIC BLOOD PRESSURE: 167 MMHG | DIASTOLIC BLOOD PRESSURE: 80 MMHG

## 2024-09-12 DIAGNOSIS — R00.1 BRADYCARDIA: ICD-10-CM

## 2024-09-12 DIAGNOSIS — R73.9 HYPERGLYCEMIA: Primary | ICD-10-CM

## 2024-09-12 LAB
ALBUMIN SERPL-MCNC: 3.9 G/DL (ref 3.2–4.8)
ALBUMIN/GLOB SERPL: 1.4 {RATIO} (ref 1–2)
ALP LIVER SERPL-CCNC: 91 U/L
ALT SERPL-CCNC: 11 U/L
ANION GAP SERPL CALC-SCNC: 5 MMOL/L (ref 0–18)
AST SERPL-CCNC: 22 U/L (ref ?–34)
BASOPHILS # BLD AUTO: 0.06 X10(3) UL (ref 0–0.2)
BASOPHILS NFR BLD AUTO: 1.3 %
BILIRUB SERPL-MCNC: 1.1 MG/DL (ref 0.2–1.1)
BILIRUB UR QL STRIP.AUTO: NEGATIVE
BUN BLD-MCNC: 15 MG/DL (ref 9–23)
CALCIUM BLD-MCNC: 9.3 MG/DL (ref 8.7–10.4)
CHLORIDE SERPL-SCNC: 100 MMOL/L (ref 98–112)
CLARITY UR REFRACT.AUTO: CLEAR
CO2 SERPL-SCNC: 30 MMOL/L (ref 21–32)
COLOR UR AUTO: YELLOW
CREAT BLD-MCNC: 0.93 MG/DL
EGFRCR SERPLBLD CKD-EPI 2021: 85 ML/MIN/1.73M2 (ref 60–?)
EOSINOPHIL # BLD AUTO: 0.36 X10(3) UL (ref 0–0.7)
EOSINOPHIL NFR BLD AUTO: 7.5 %
ERYTHROCYTE [DISTWIDTH] IN BLOOD BY AUTOMATED COUNT: 12.5 %
GLOBULIN PLAS-MCNC: 2.7 G/DL (ref 2–3.5)
GLUCOSE BLD-MCNC: 349 MG/DL (ref 70–99)
GLUCOSE UR STRIP.AUTO-MCNC: 500 MG/DL
HCT VFR BLD AUTO: 34.4 %
HGB BLD-MCNC: 11.7 G/DL
IMM GRANULOCYTES # BLD AUTO: 0.02 X10(3) UL (ref 0–1)
IMM GRANULOCYTES NFR BLD: 0.4 %
KETONES UR STRIP.AUTO-MCNC: NEGATIVE MG/DL
LEUKOCYTE ESTERASE UR QL STRIP.AUTO: NEGATIVE
LYMPHOCYTES # BLD AUTO: 1.22 X10(3) UL (ref 1–4)
LYMPHOCYTES NFR BLD AUTO: 25.5 %
MCH RBC QN AUTO: 33.1 PG (ref 26–34)
MCHC RBC AUTO-ENTMCNC: 34 G/DL (ref 31–37)
MCV RBC AUTO: 97.5 FL
MONOCYTES # BLD AUTO: 0.55 X10(3) UL (ref 0.1–1)
MONOCYTES NFR BLD AUTO: 11.5 %
NEUTROPHILS # BLD AUTO: 2.58 X10 (3) UL (ref 1.5–7.7)
NEUTROPHILS # BLD AUTO: 2.58 X10(3) UL (ref 1.5–7.7)
NEUTROPHILS NFR BLD AUTO: 53.8 %
NITRITE UR QL STRIP.AUTO: NEGATIVE
OSMOLALITY SERPL CALC.SUM OF ELEC: 295 MOSM/KG (ref 275–295)
PH UR STRIP.AUTO: 6.5 [PH] (ref 5–8)
PLATELET # BLD AUTO: 103 10(3)UL (ref 150–450)
POTASSIUM SERPL-SCNC: 4.6 MMOL/L (ref 3.5–5.1)
PROT SERPL-MCNC: 6.6 G/DL (ref 5.7–8.2)
PROT UR STRIP.AUTO-MCNC: NEGATIVE MG/DL
RBC # BLD AUTO: 3.53 X10(6)UL
RBC UR QL AUTO: NEGATIVE
SODIUM SERPL-SCNC: 135 MMOL/L (ref 136–145)
SP GR UR STRIP.AUTO: 1.01 (ref 1–1.03)
TROPONIN I SERPL HS-MCNC: 5 NG/L
TSI SER-ACNC: 1.85 MIU/ML (ref 0.55–4.78)
UROBILINOGEN UR STRIP.AUTO-MCNC: 0.2 MG/DL
WBC # BLD AUTO: 4.8 X10(3) UL (ref 4–11)

## 2024-09-12 PROCEDURE — 71045 X-RAY EXAM CHEST 1 VIEW: CPT | Performed by: EMERGENCY MEDICINE

## 2024-09-12 PROCEDURE — 85025 COMPLETE CBC W/AUTO DIFF WBC: CPT

## 2024-09-12 PROCEDURE — 80053 COMPREHEN METABOLIC PANEL: CPT | Performed by: EMERGENCY MEDICINE

## 2024-09-12 PROCEDURE — 99285 EMERGENCY DEPT VISIT HI MDM: CPT

## 2024-09-12 PROCEDURE — 81003 URINALYSIS AUTO W/O SCOPE: CPT | Performed by: EMERGENCY MEDICINE

## 2024-09-12 PROCEDURE — 84484 ASSAY OF TROPONIN QUANT: CPT | Performed by: EMERGENCY MEDICINE

## 2024-09-12 PROCEDURE — 85025 COMPLETE CBC W/AUTO DIFF WBC: CPT | Performed by: EMERGENCY MEDICINE

## 2024-09-12 PROCEDURE — 80053 COMPREHEN METABOLIC PANEL: CPT

## 2024-09-12 PROCEDURE — 93005 ELECTROCARDIOGRAM TRACING: CPT

## 2024-09-12 PROCEDURE — 96360 HYDRATION IV INFUSION INIT: CPT

## 2024-09-12 PROCEDURE — 84443 ASSAY THYROID STIM HORMONE: CPT | Performed by: EMERGENCY MEDICINE

## 2024-09-12 PROCEDURE — 93010 ELECTROCARDIOGRAM REPORT: CPT

## 2024-09-13 LAB
ATRIAL RATE: 50 BPM
P AXIS: 78 DEGREES
P-R INTERVAL: 166 MS
Q-T INTERVAL: 480 MS
QRS DURATION: 102 MS
QTC CALCULATION (BEZET): 437 MS
R AXIS: 38 DEGREES
T AXIS: 55 DEGREES
VENTRICULAR RATE: 50 BPM

## 2024-09-13 NOTE — ED PROVIDER NOTES
Patient Seen in: [facility] Emergency Department      History     Chief Complaint   Patient presents with    Arrythmia/Palpitations    Altered Mental Status     Stated Complaint: Bradycardia, AMS    Subjective:   HPI    79-year-old male presents to the emergency department with his wife due to report of altered behavior.  The patient has a history of Alzheimer's disease but his wife says that he has been more fatigued recently.  She was concerned that he has a urinary tract infection.  The patient has a caretaker at his home and the caretaker told the patient's wife that they should go to an urgent care because the heart rate was low.  They went to an urgent care and when they arrived they were told to come to the emergency department for an EKG and evaluation.  The patient's wife tells me that her  has had bradycardia in the past.  The patient's wife reports that he has been sleeping more intensely.  He has been lightheaded this week as well.  Patient's wife gives the history.  There is been no reported vomiting.  No loose stools.  Reviewing his records he was seen on 7/31/2024 for choking episode and a concern for aspiration.  The wife tells me that he has had continued dysphagia and he is on a thickened liquids because of his risk of aspiration.    Objective:   Past Medical History:    Alzheimer disease (HCC)    Diabetes mellitus (HCC)    type 1. endo: Dr. Blancas/[facility]    ED (erectile dysfunction)    High cholesterol    Hyperlipidemia    Obesity    Personal history of colonic polyps    Type 1 diabetes mellitus (HCC)    Visual impairment              No pertinent past surgical history.              No pertinent social history.            Review of Systems    Positive for stated Chief Complaint: Arrythmia/Palpitations and Altered Mental Status    Other systems are as noted in HPI.  Constitutional and vital signs reviewed.      All other systems reviewed and negative except as noted above.    Physical  Exam     ED Triage Vitals [09/12/24 1928]   /73   Pulse 51   Resp 16   Temp 97.3 °F (36.3 °C)   Temp src Temporal   SpO2 99 %   O2 Device None (Room air)       Current Vitals:   Vital Signs  BP: 155/77  Pulse: (!) 43  Resp: 13  Temp: 97.3 °F (36.3 °C)  Temp src: Temporal  MAP (mmHg): 99    Oxygen Therapy  SpO2: 100 %  O2 Device: None (Room air)            Physical Exam  General: This a pleasant but nontoxic-appearing 79-year-old male.  He is at his baseline mental status per his wife.    HEENT: Pupils are equal reactive to light.  Extra ocular motions are intact.  No scleral icterus or conjunctival pallor.   Lungs: Clear to auscultation bilaterally.  No wheezes, rhonchi, or rales appreciated.  No accessory muscle use noted for breathing.  Cardiac: Heart rate of 50 normal S1 and 2 without murmurs or ectopy appreciated  Abdomen: Soft on examination without tenderness to deep palpation or to percussion.  No masses appreciated.  Bowel sounds are normoactive.  No CVA tenderness.  Extremities: No tenderness on palpation of the extremities.  Moving all 4 without difficulty.    ED Course     Labs Reviewed   COMP METABOLIC PANEL (14) - Abnormal; Notable for the following components:       Result Value    Glucose 349 (*)     Sodium 135 (*)     All other components within normal limits   CBC WITH DIFFERENTIAL WITH PLATELET - Abnormal; Notable for the following components:    RBC 3.53 (*)     HGB 11.7 (*)     HCT 34.4 (*)     .0 (*)     All other components within normal limits   URINALYSIS WITH CULTURE REFLEX - Abnormal; Notable for the following components:    Glucose Urine 500 (*)     All other components within normal limits   TROPONIN I HIGH SENSITIVITY - Normal   TSH W REFLEX TO FREE T4 - Normal   RAINBOW DRAW BLUE     EKG    Rate, intervals and axes as noted on EKG Report.  Rate: 50  Rhythm: Sinus Rhythm  Reading: Sinus bradycardia premature atrial complexes otherwise normal EKG         Narrative  PROCEDURE:   XR CHEST AP PORTABLE  (CPT=71045)     TECHNIQUE:  AP chest radiograph was obtained.     COMPARISON:  [facility] , XR, XR CHEST AP PORTABLE  (CPT=71045), 11/05/2022, 11:44 AM.     INDICATIONS:  Bradycardia, AMS     PATIENT STATED HISTORY: (As transcribed by Technologist)  Patient offered no additional history at this time.         The FINDINGS:  Tortuous ectatic aorta present.  The heart is normal in size.  The lungs are clear of active--appearing disease process.  The costophrenic angles are sharp.  There is no active disease seen.                  Impression  CONCLUSION:  Tortuous ectatic aorta.  No active disease seen.     LOCATION:  [facility]                 Dictated by (CST): Spencer Mcintyre MD on 9/12/2024 at 10:16 PM      Finalized by (CST): Spencer Mcintyre MD on 9/12/2024 at 10:17 PM                   MDM    Differential diagnosis includes but is not limited to thyroid disease, electrolyte abnormality, cardiac ischemia, thyroid disease, urinary tract infection, anemia.  The patient has not been hypotensive with his bradycardia.  Laboratories were sent.  TSH was normal troponin was normal glucose of 349 with a sodium 135 the rest the metabolic and was normal.  Urinalysis did not show any signs of infection.  Hemoglobin 11.7 hematocrit 34.4 platelet count of 103,000.  The rest of the CBC was normal.  Chest x-ray performed  I reviewed the x-rays and agree with the radiologist report that showed torturous ectatic aorta.  No active disease seen.    The patient has been bradycardic but he is not likely symptomatic from the bradycardia considering that his blood pressures are at perfusing pressure.  He has been told that he should be seeing cardiology as an outpatient.  I will suggest they follow with cardiology.  At this time the patient does not have a urinary tract infection but his glucose is higher than typical.  He is not exhibiting findings of DKA.  The patient's wife tells me that in the evening when she finds his  blood sugars is elevated she will usually give a dose of insulin prior to bedtime.  We did discuss the patient's oral intake has been slightly curtailed because of his swallowing issues.  He will get some IV fluids here.  He will then be discharged to home.  Encouraged to follow with their primary care physician and cardiology as an outpatient.  Return if symptoms progress or worsen.  Note to Patient  The 21st Century Cures Act makes medical notes like these available to patients in the interest of transparency. However, be advised this is a medical document and is intended as lhvd-ln-nvgj communication; it is written in medical language and may appear blunt, direct, or contain abbreviations or verbiage that are unfamiliar. Medical documents are intended to carry relevant information, facts as evident, and the clinical opinion of the practitioner.  I have considered other serious etiologies for this patient's complaints, however the presentation is not consistent with such entities. Patient or caregiver understands the course of events that occurred in the emergency department. Instructed to return to emergency department or contact PCP for persistent, recurrent, or worsening symptoms.     ^^Please note that this report has been produced using speech recognition software and may contain errors related to that system including, but not limited to, errors in grammar, punctuation, and spelling, as well as words and phrases that possibly may have been recognized inappropriately.  If there are any questions or concerns, contact the dictating provider for clarification.              Medical Decision Making      Disposition and Plan     Clinical Impression:  1. Hyperglycemia    2. Bradycardia         Disposition:  Discharge  9/12/2024 11:16 pm    Follow-up:  Nico Poe MD  [address]  [address] [address]  [phone]    Schedule an appointment as soon as possible for a visit in 2 day(s)      Oscar  Luis HOLLINGSWORTH MD  [address]   [address]  [facility] [address]  [phone]    Schedule an appointment as soon as possible for a visit in 2 day(s)            Medications Prescribed:  Current Discharge Medication List

## 2024-09-13 NOTE — ED INITIAL ASSESSMENT (HPI)
Pt arrives to ed w c/o low heart rate. Pt seen at  and told to come here due to heart rate in the 30s. Pt w hx of alzheimer's and wife reports pt has been more \"off\" the last couple days and concerned for UTI. Wife reports more lightheaded this week and increased sleeping.

## (undated) NOTE — IP AVS SNAPSHOT
[address]            (For Outpatient Use Only) Initial Admit Date: 2022   Inpt/Obs Admit Date: Inpt: 22 / Obs: N/A   Discharge Date:    Domingo Barn:  [de-identified]   MRN: [de-identified]   CSN: [id number]   CEID: [id number]        ENCOUNTER  Patient Class: Inpatient Admitting Provider: Luna Dash MD Unit: [address] [place] [address] Service: Med/Behavioral Attending Provider: Alireza Michael MD   Bed: 1005-Y   Visit Type:   Referring Physician: No ref. provider found Billing Flag:    Admit Diagnosis: Influenza [J11.1]      PATIENT  Legal Name:   Stuart West   Legal Sex: Male  Gender ID: Male             [address] Name:   Yehudazenapito Mcginnis PCP:  Mary Yi MD Home: [phone]   Address:  [place] [address] : 1945 (77 yrs) Mobile: [phone]         City/[facility]: [facility] [address] Marital:  Language: Urban [place]: [place]: [id number] Islam: [address] Not [facility]*     Race: White Ethnicity: Non  Or [address] [place] [address]   Name Relationship Legal Guardian? Home Phone Work Phone Mobile Phone   1. Hazel Vieyra  2.  Ping Ontiveros Spouse  Daughter          [phone]     GUARANTOR  Guarantor: Murali Ceron : 1945 Home Phone: [phone]   Address: [place] [address]  Sex: Male Work Phone:    City/State/Zip: [place] [address]   Rel. to Patient: Self Guarantor ID: [id number]   GUARANTOR EMPLOYER   Employer:  Status: RETIRED     COVERAGE  PRIMARY INSURANCE   Payor: [address] [place] [address],[address]: [address] [place] [address]*   Group Number: [id number] Insurance Type: [address] Name: Shazia Saucedo : 1945   Subscriber ID: [id number] Pt Rel to Subscriber: Self   SECONDARY INSURANCE   Payor:  Plan:    Group Number:  Insurance Type:    Subscriber Name:  Subscriber :    Subscriber ID:  Pt Rel to Subscriber:    TERTIARY INSURANCE   Payor:  Plan:    Group Number:  Insurance Type:    Subscriber Name:  Subscriber : Subscriber ID:  Pt Rel to Subscriber:    Hospital Account Financial Class: Medicare Advantage    November 11, 2022